# Patient Record
Sex: MALE | Race: OTHER
[De-identification: names, ages, dates, MRNs, and addresses within clinical notes are randomized per-mention and may not be internally consistent; named-entity substitution may affect disease eponyms.]

---

## 2024-08-29 ENCOUNTER — APPOINTMENT (OUTPATIENT)
Dept: AFTER HOURS CARE | Facility: EMERGENCY ROOM | Age: 54
End: 2024-08-29
Payer: COMMERCIAL

## 2024-08-29 PROCEDURE — 99204 OFFICE O/P NEW MOD 45 MIN: CPT

## 2024-08-30 PROBLEM — Z00.00 ENCOUNTER FOR PREVENTIVE HEALTH EXAMINATION: Status: ACTIVE | Noted: 2024-08-30

## 2024-08-30 NOTE — PHYSICAL EXAM
[TextEntry] :    PLEASE SEE HPI FOR ADDITIONAL RELEVANT PHYSICAL EXAM FINDINGS GENERAL: no acute distress, nontoxic HEAD: normocephalic EYES: no scleral icterus NECK: trachea midline, Full ROM RESP: no respiratory distress ABD: nondistended MSK: no gross deformity NEURO: alert  SKIN: no rash

## 2024-08-30 NOTE — PLAN
[FreeTextEntry1] :  Young m with sore throat and nonproductive cough. Patient without fever, lymphadenopathy or exudate. He does not meet centor criteria for antibiotics.  I believe patient has a viral uri. Patient told to take a covid test and call us back if positive and we will send in paxlovid.  Patient also advised to use supportive care, rest, warm salt water gargles and declined a work note at this time.  Return precautions discussed.

## 2024-08-30 NOTE — HISTORY OF PRESENT ILLNESS
[Home] : at home, [unfilled] , at the time of the visit. [Other Location: e.g. Home (Enter Location, City,State)___] : at [unfilled] [Verbal consent obtained from patient] : the patient, [unfilled] [FreeTextEntry8] :  Young m with sore throat, nonproductive cough for a few days. He denies fever, sick contacts, chest pain or sob. He did not take a covid test yet and presents for evaluation.

## 2024-09-27 ENCOUNTER — APPOINTMENT (OUTPATIENT)
Dept: ORTHOPEDIC SURGERY | Facility: CLINIC | Age: 54
End: 2024-09-27
Payer: COMMERCIAL

## 2024-09-27 ENCOUNTER — RESULT REVIEW (OUTPATIENT)
Age: 54
End: 2024-09-27

## 2024-09-27 VITALS — WEIGHT: 190 LBS | HEIGHT: 74 IN | BODY MASS INDEX: 24.38 KG/M2

## 2024-09-27 DIAGNOSIS — M17.11 UNILATERAL PRIMARY OSTEOARTHRITIS, RIGHT KNEE: ICD-10-CM

## 2024-09-27 PROCEDURE — 99202 OFFICE O/P NEW SF 15 MIN: CPT

## 2024-09-27 NOTE — PHYSICAL EXAM
[de-identified] : Knee ranges 5-115 degrees with pain and crepitus stable to varus, valgus, anterior and posterior stress neurovascularly intact distally no pain with hip range of motion negative straight leg raise no effusion, synovitis, or edema or erythema skin intact [de-identified] : multiple views of the knees show severe arthritis with bone on bone contact, ricky-articular osteophytes, subchondral sclerosis and cysts right knee

## 2024-09-27 NOTE — HISTORY OF PRESENT ILLNESS
[de-identified] : Patient comes in with more than 6 months of right knee pain atraumatic in onset.  Patient has tried greater than 6 months of nsaids, physical therapy and doctor directed exercises and injections.  Patient continues to have pain that is 8/10 in severity and interferes with activities of daily living such as putting on shoes and socks, walking two blocks, and getting up and down from a chair and toilet.  Knee osteoarthritis outcome score is 8.1 2 prior acl surgerys years ago

## 2024-09-27 NOTE — DISCUSSION/SUMMARY
[de-identified] : This patient has failed non-operative treatments for right knee arthritis and is now indicated for a total knee replacement.  I had a long discussion with the patient regarding the plan, the expected outcome, the risks, benefits, and alternatives of surgery. The risks include, but are not limited to, infection (which may require future surgery and removal of implants), bleeding (which may require a transfusion), damage to nerves, arteries, veins, tendons, muscles and other adjacent structures leading to numbness, weakness, decreased function and/or possible surgical repair. Also discussed the possibilities of intraoperative and post operative fractures, implant loosening, stiffness, need for extensive therapy and revision for variety of reasons, blood clots and other possible medical complications etc. michel zimmereoval of hardware acl screws possible press fit